# Patient Record
Sex: FEMALE | Race: WHITE | NOT HISPANIC OR LATINO | URBAN - METROPOLITAN AREA
[De-identification: names, ages, dates, MRNs, and addresses within clinical notes are randomized per-mention and may not be internally consistent; named-entity substitution may affect disease eponyms.]

---

## 2020-01-23 ENCOUNTER — OFFICE VISIT (OUTPATIENT)
Dept: INTERNAL MEDICINE | Facility: CLINIC | Age: 25
End: 2020-01-23
Payer: COMMERCIAL

## 2020-01-23 ENCOUNTER — LAB VISIT (OUTPATIENT)
Dept: LAB | Facility: HOSPITAL | Age: 25
End: 2020-01-23
Attending: INTERNAL MEDICINE
Payer: COMMERCIAL

## 2020-01-23 VITALS
RESPIRATION RATE: 16 BRPM | WEIGHT: 204.81 LBS | DIASTOLIC BLOOD PRESSURE: 60 MMHG | SYSTOLIC BLOOD PRESSURE: 114 MMHG | HEIGHT: 67 IN | BODY MASS INDEX: 32.15 KG/M2 | HEART RATE: 82 BPM | TEMPERATURE: 99 F

## 2020-01-23 DIAGNOSIS — F90.9 ATTENTION DEFICIT HYPERACTIVITY DISORDER (ADHD), UNSPECIFIED ADHD TYPE: ICD-10-CM

## 2020-01-23 DIAGNOSIS — Z00.00 ANNUAL PHYSICAL EXAM: ICD-10-CM

## 2020-01-23 DIAGNOSIS — Z98.84 HISTORY OF BARIATRIC SURGERY: ICD-10-CM

## 2020-01-23 DIAGNOSIS — Z00.00 ANNUAL PHYSICAL EXAM: Primary | ICD-10-CM

## 2020-01-23 LAB
25(OH)D3+25(OH)D2 SERPL-MCNC: 40 NG/ML (ref 30–96)
ALBUMIN SERPL BCP-MCNC: 4 G/DL (ref 3.5–5.2)
ALP SERPL-CCNC: 78 U/L (ref 55–135)
ALT SERPL W/O P-5'-P-CCNC: 12 U/L (ref 10–44)
ANION GAP SERPL CALC-SCNC: 9 MMOL/L (ref 8–16)
AST SERPL-CCNC: 16 U/L (ref 10–40)
BASOPHILS # BLD AUTO: 0.03 K/UL (ref 0–0.2)
BASOPHILS NFR BLD: 0.5 % (ref 0–1.9)
BILIRUB SERPL-MCNC: 0.5 MG/DL (ref 0.1–1)
BUN SERPL-MCNC: 10 MG/DL (ref 6–20)
CALCIUM SERPL-MCNC: 9.7 MG/DL (ref 8.7–10.5)
CHLORIDE SERPL-SCNC: 106 MMOL/L (ref 95–110)
CHOLEST SERPL-MCNC: 233 MG/DL (ref 120–199)
CHOLEST/HDLC SERPL: 3.2 {RATIO} (ref 2–5)
CO2 SERPL-SCNC: 28 MMOL/L (ref 23–29)
CREAT SERPL-MCNC: 0.8 MG/DL (ref 0.5–1.4)
DIFFERENTIAL METHOD: ABNORMAL
EOSINOPHIL # BLD AUTO: 0 K/UL (ref 0–0.5)
EOSINOPHIL NFR BLD: 0.7 % (ref 0–8)
ERYTHROCYTE [DISTWIDTH] IN BLOOD BY AUTOMATED COUNT: 12.6 % (ref 11.5–14.5)
EST. GFR  (AFRICAN AMERICAN): >60 ML/MIN/1.73 M^2
EST. GFR  (NON AFRICAN AMERICAN): >60 ML/MIN/1.73 M^2
ESTIMATED AVG GLUCOSE: 100 MG/DL (ref 68–131)
FERRITIN SERPL-MCNC: 59 NG/ML (ref 20–300)
GLUCOSE SERPL-MCNC: 88 MG/DL (ref 70–110)
HBA1C MFR BLD HPLC: 5.1 % (ref 4–5.6)
HCT VFR BLD AUTO: 44.5 % (ref 37–48.5)
HDLC SERPL-MCNC: 72 MG/DL (ref 40–75)
HDLC SERPL: 30.9 % (ref 20–50)
HGB BLD-MCNC: 14.2 G/DL (ref 12–16)
IMM GRANULOCYTES # BLD AUTO: 0.02 K/UL (ref 0–0.04)
IMM GRANULOCYTES NFR BLD AUTO: 0.3 % (ref 0–0.5)
IRON SERPL-MCNC: 118 UG/DL (ref 30–160)
LDLC SERPL CALC-MCNC: 148.8 MG/DL (ref 63–159)
LYMPHOCYTES # BLD AUTO: 2 K/UL (ref 1–4.8)
LYMPHOCYTES NFR BLD: 33.1 % (ref 18–48)
MCH RBC QN AUTO: 30.5 PG (ref 27–31)
MCHC RBC AUTO-ENTMCNC: 31.9 G/DL (ref 32–36)
MCV RBC AUTO: 96 FL (ref 82–98)
MONOCYTES # BLD AUTO: 0.3 K/UL (ref 0.3–1)
MONOCYTES NFR BLD: 5.4 % (ref 4–15)
NEUTROPHILS # BLD AUTO: 3.7 K/UL (ref 1.8–7.7)
NEUTROPHILS NFR BLD: 60 % (ref 38–73)
NONHDLC SERPL-MCNC: 161 MG/DL
NRBC BLD-RTO: 0 /100 WBC
PLATELET # BLD AUTO: 179 K/UL (ref 150–350)
PMV BLD AUTO: 11.8 FL (ref 9.2–12.9)
POTASSIUM SERPL-SCNC: 4.8 MMOL/L (ref 3.5–5.1)
PROT SERPL-MCNC: 7.3 G/DL (ref 6–8.4)
RBC # BLD AUTO: 4.65 M/UL (ref 4–5.4)
SATURATED IRON: 21 % (ref 20–50)
SODIUM SERPL-SCNC: 143 MMOL/L (ref 136–145)
TOTAL IRON BINDING CAPACITY: 559 UG/DL (ref 250–450)
TRANSFERRIN SERPL-MCNC: 378 MG/DL (ref 200–375)
TRIGL SERPL-MCNC: 61 MG/DL (ref 30–150)
TSH SERPL DL<=0.005 MIU/L-ACNC: 1.18 UIU/ML (ref 0.4–4)
WBC # BLD AUTO: 6.08 K/UL (ref 3.9–12.7)

## 2020-01-23 PROCEDURE — 82728 ASSAY OF FERRITIN: CPT

## 2020-01-23 PROCEDURE — 85025 COMPLETE CBC W/AUTO DIFF WBC: CPT

## 2020-01-23 PROCEDURE — 83036 HEMOGLOBIN GLYCOSYLATED A1C: CPT

## 2020-01-23 PROCEDURE — 84443 ASSAY THYROID STIM HORMONE: CPT

## 2020-01-23 PROCEDURE — 99999 PR PBB SHADOW E&M-NEW PATIENT-LVL III: ICD-10-PCS | Mod: PBBFAC,,, | Performed by: INTERNAL MEDICINE

## 2020-01-23 PROCEDURE — 83540 ASSAY OF IRON: CPT

## 2020-01-23 PROCEDURE — 82306 VITAMIN D 25 HYDROXY: CPT

## 2020-01-23 PROCEDURE — 99999 PR PBB SHADOW E&M-NEW PATIENT-LVL III: CPT | Mod: PBBFAC,,, | Performed by: INTERNAL MEDICINE

## 2020-01-23 PROCEDURE — 99385 PREV VISIT NEW AGE 18-39: CPT | Mod: S$GLB,,, | Performed by: INTERNAL MEDICINE

## 2020-01-23 PROCEDURE — 80061 LIPID PANEL: CPT

## 2020-01-23 PROCEDURE — 36415 COLL VENOUS BLD VENIPUNCTURE: CPT | Mod: PO

## 2020-01-23 PROCEDURE — 99385 PR PREVENTIVE VISIT,NEW,18-39: ICD-10-PCS | Mod: S$GLB,,, | Performed by: INTERNAL MEDICINE

## 2020-01-23 PROCEDURE — 80053 COMPREHEN METABOLIC PANEL: CPT

## 2020-01-23 RX ORDER — MULTIVITAMIN
1 TABLET ORAL DAILY
COMMUNITY

## 2020-01-23 RX ORDER — FERROUS SULFATE 324(65)MG
325 TABLET, DELAYED RELEASE (ENTERIC COATED) ORAL DAILY
COMMUNITY

## 2020-01-23 RX ORDER — NORETHINDRONE ACETATE AND ETHINYL ESTRADIOL AND FERROUS FUMARATE 1MG-20(21)
KIT ORAL
COMMUNITY
Start: 2019-10-23

## 2020-01-23 RX ORDER — METHYLPHENIDATE HYDROCHLORIDE 20 MG/1
20 TABLET ORAL 2 TIMES DAILY
COMMUNITY

## 2020-01-23 NOTE — PROGRESS NOTES
Subjective:       Patient ID: Fred Winter is a 24 y.o. female.    Chief Complaint: Annual Exam    HPI    24 y.o. female here for annual exam.     Health Maintenance/ Screening:   Lipid disorders/ASCVD risk: due    DM: A1c due  Cervical Cancer (21-65y):  Has apt scheduled      Vaccines:   Influenza: utd   Tetanus: thinks utd     HPV: unsure, will check her records      Past Medical History:   Diagnosis Date    ADHD     History of Chiari malformation     Iron deficiency     PCOS (polycystic ovarian syndrome)      Past Surgical History:   Procedure Laterality Date    ROBOT-ASSISTED LAPAROSCOPIC SLEEVE GASTRECTOMY      SINUS SURGERY      TONSILLECTOMY       Family History   Problem Relation Age of Onset    Multiple sclerosis Mother     Chiari malformation Mother     No Known Problems Father     Chiari malformation Sister     Diabetes Maternal Grandmother     Thyroid disease Maternal Grandmother     Hypertension Maternal Grandmother     Heart disease Maternal Grandmother     Hypertension Maternal Grandfather     Heart disease Maternal Grandfather      Social History     Socioeconomic History    Marital status: Single     Spouse name: Not on file    Number of children: Not on file    Years of education: Not on file    Highest education level: Not on file   Occupational History    Not on file   Social Needs    Financial resource strain: Not on file    Food insecurity:     Worry: Not on file     Inability: Not on file    Transportation needs:     Medical: Not on file     Non-medical: Not on file   Tobacco Use    Smoking status: Never Smoker    Smokeless tobacco: Never Used   Substance and Sexual Activity    Alcohol use: Not on file    Drug use: Not on file    Sexual activity: Not on file   Lifestyle    Physical activity:     Days per week: Not on file     Minutes per session: Not on file    Stress: Not on file   Relationships    Social connections:     Talks on phone: Not on file     Gets  "together: Not on file     Attends Muslim service: Not on file     Active member of club or organization: Not on file     Attends meetings of clubs or organizations: Not on file     Relationship status: Not on file   Other Topics Concern    Not on file   Social History Narrative    Not on file     Review of patient's allergies indicates:   Allergen Reactions    Latex, natural rubber        Current Outpatient Medications:     BIOTIN ORAL, Take by mouth., Disp: , Rfl:     cyanocobalamin/cobamamide (B12 SL), Place under the tongue., Disp: , Rfl:     ferrous sulfate 324 mg (65 mg iron) TbEC, Take 325 mg by mouth once daily., Disp: , Rfl:     JUNEL FE 1/20, 28, 1 mg-20 mcg (21)/75 mg (7) per tablet, TK 1 T PO QD, Disp: , Rfl:     methylphenidate HCl (RITALIN) 20 MG tablet, Take 20 mg by mouth 2 (two) times daily., Disp: , Rfl:     multivitamin (ONE DAILY MULTIVITAMIN) per tablet, Take 1 tablet by mouth once daily., Disp: , Rfl:       Review of Systems   Constitutional: Negative for diaphoresis and fever.   HENT: Negative for dental problem and trouble swallowing.    Eyes: Negative for discharge and redness.   Respiratory: Negative for cough and shortness of breath.    Cardiovascular: Negative for chest pain and leg swelling.   Gastrointestinal: Negative for abdominal pain, blood in stool, constipation and diarrhea.   Genitourinary: Negative for difficulty urinating, dysuria and frequency.   Musculoskeletal: Negative for gait problem and joint swelling.   Skin: Negative for pallor, rash and wound.   Neurological: Negative for weakness and numbness.       Objective:        Vitals:    01/23/20 1133   BP: 114/60   Pulse: 82   Resp: 16   Temp: 98.7 °F (37.1 °C)   TempSrc: Oral   Weight: 92.9 kg (204 lb 12.9 oz)   Height: 5' 7" (1.702 m)       Body mass index is 32.08 kg/m².    Physical Exam   Constitutional: She is oriented to person, place, and time. She appears well-developed. No distress.   HENT:   Head: " Normocephalic and atraumatic.   Right Ear: Tympanic membrane normal.   Left Ear: Tympanic membrane normal.   Nose: Nose normal.   Mouth/Throat: Oropharynx is clear and moist.   Eyes: Conjunctivae and EOM are normal.   Neck: Normal range of motion. Neck supple. No tracheal deviation present. No thyromegaly present.   Cardiovascular: Normal rate, regular rhythm, normal heart sounds and intact distal pulses.   Pulmonary/Chest: Effort normal and breath sounds normal.   Abdominal: Soft. Bowel sounds are normal. She exhibits no distension. There is no tenderness.   Musculoskeletal: Normal range of motion. She exhibits no edema.   Lymphadenopathy:     She has no cervical adenopathy.   Neurological: She is alert and oriented to person, place, and time. No sensory deficit.   Skin: Skin is warm and dry. She is not diaphoretic. No cyanosis. Nails show no clubbing.   Psychiatric: She has a normal mood and affect. Her behavior is normal. Judgment normal.       Assessment:     1. Annual physical exam    2. History of bariatric surgery    3. Attention deficit hyperactivity disorder (ADHD), unspecified ADHD type           Plan:         1. Annual physical exam  - flu shot utd  - tetanus precautions discussed  - she will notify clinic if she wants prescription for HPV vaccines  - gyn exam scheduled  - CBC auto differential; Future  - Comprehensive metabolic panel; Future  - Hemoglobin A1c; Future  - Lipid panel; Future  - TSH; Future  - Iron and TIBC; Future  - Ferritin; Future  - Vitamin D; Future    2. History of bariatric surgery  - Comprehensive metabolic panel; Future  - Iron and TIBC; Future  - Ferritin; Future  - Vitamin D; Future    3. Attention deficit hyperactivity disorder (ADHD), unspecified ADHD type  - she is weaning off of medications currently, but will bring testing records and notify me if she would like to continue medications.            Patient note was created using [employer] Dict[employer].  Any errors in syntax or  even information may not have been identified and edited on initial review prior to signing this note.

## 2020-01-24 ENCOUNTER — TELEPHONE (OUTPATIENT)
Dept: INTERNAL MEDICINE | Facility: CLINIC | Age: 25
End: 2020-01-24

## 2020-01-24 NOTE — TELEPHONE ENCOUNTER
----- Message from Devi Vaughan MD sent at 1/24/2020  7:10 AM CST -----  Results released to patient portal.

## 2020-03-02 ENCOUNTER — OFFICE VISIT (OUTPATIENT)
Dept: INTERNAL MEDICINE | Facility: CLINIC | Age: 25
End: 2020-03-02
Payer: COMMERCIAL

## 2020-03-02 VITALS
HEART RATE: 80 BPM | HEIGHT: 67 IN | WEIGHT: 205.5 LBS | TEMPERATURE: 99 F | BODY MASS INDEX: 32.25 KG/M2 | SYSTOLIC BLOOD PRESSURE: 102 MMHG | RESPIRATION RATE: 18 BRPM | DIASTOLIC BLOOD PRESSURE: 68 MMHG

## 2020-03-02 DIAGNOSIS — J06.9 URTI (ACUTE UPPER RESPIRATORY INFECTION): Primary | ICD-10-CM

## 2020-03-02 DIAGNOSIS — J40 BRONCHITIS: ICD-10-CM

## 2020-03-02 DIAGNOSIS — R05.9 COUGH: ICD-10-CM

## 2020-03-02 PROCEDURE — 99214 OFFICE O/P EST MOD 30 MIN: CPT | Mod: 25,S$GLB,, | Performed by: NURSE PRACTITIONER

## 2020-03-02 PROCEDURE — 99999 PR PBB SHADOW E&M-EST. PATIENT-LVL III: CPT | Mod: PBBFAC,,, | Performed by: NURSE PRACTITIONER

## 2020-03-02 PROCEDURE — 96372 PR INJECTION,THERAP/PROPH/DIAG2ST, IM OR SUBCUT: ICD-10-PCS | Mod: S$GLB,,, | Performed by: NURSE PRACTITIONER

## 2020-03-02 PROCEDURE — 99214 PR OFFICE/OUTPT VISIT, EST, LEVL IV, 30-39 MIN: ICD-10-PCS | Mod: 25,S$GLB,, | Performed by: NURSE PRACTITIONER

## 2020-03-02 PROCEDURE — 96372 THER/PROPH/DIAG INJ SC/IM: CPT | Mod: S$GLB,,, | Performed by: NURSE PRACTITIONER

## 2020-03-02 PROCEDURE — 99999 PR PBB SHADOW E&M-EST. PATIENT-LVL III: ICD-10-PCS | Mod: PBBFAC,,, | Performed by: NURSE PRACTITIONER

## 2020-03-02 RX ORDER — AMOXICILLIN AND CLAVULANATE POTASSIUM 875; 125 MG/1; MG/1
1 TABLET, FILM COATED ORAL EVERY 12 HOURS
Qty: 14 TABLET | Refills: 0 | Status: SHIPPED | OUTPATIENT
Start: 2020-03-02 | End: 2020-03-09

## 2020-03-02 RX ORDER — NORETHINDRONE ACETATE AND ETHINYL ESTRADIOL 1.5-30(21)
1 KIT ORAL DAILY
COMMUNITY

## 2020-03-02 RX ORDER — BENZONATATE 200 MG/1
200 CAPSULE ORAL 3 TIMES DAILY PRN
Qty: 30 CAPSULE | Refills: 0 | Status: SHIPPED | OUTPATIENT
Start: 2020-03-02 | End: 2020-03-12

## 2020-03-02 RX ORDER — TRIAMCINOLONE ACETONIDE 40 MG/ML
40 INJECTION, SUSPENSION INTRA-ARTICULAR; INTRAMUSCULAR ONCE
Status: COMPLETED | OUTPATIENT
Start: 2020-03-02 | End: 2020-03-02

## 2020-03-02 RX ADMIN — TRIAMCINOLONE ACETONIDE 40 MG: 40 INJECTION, SUSPENSION INTRA-ARTICULAR; INTRAMUSCULAR at 11:03

## 2020-03-02 NOTE — PROGRESS NOTES
[facility] Note    Chief Complaint      Chief Complaint   Patient presents with    Cough     dry    Chest Congestion    Generalized Body Aches     History of Present Illness      Fred Winter is a 24 y.o. female patient of Dr. Stephens who is new to me presents today for complaints coarse voice intermittent ear fullness sore throat dry cough chest congestion with some intermittent body aches over the last 2 weeks.  Has been taken xyzol without relief.    Problem List Items Addressed This Visit     None      Visit Diagnoses     URTI (acute upper respiratory infection)    -  Primary    Relevant Medications    triamcinolone acetonide injection 40 mg (Completed)    amoxicillin-clavulanate 875-125mg (AUGMENTIN) 875-125 mg per tablet    benzonatate (TESSALON) 200 MG capsule    Bronchitis        Relevant Medications    triamcinolone acetonide injection 40 mg (Completed)    amoxicillin-clavulanate 875-125mg (AUGMENTIN) 875-125 mg per tablet    benzonatate (TESSALON) 200 MG capsule    Cough        Relevant Medications    triamcinolone acetonide injection 40 mg (Completed)    amoxicillin-clavulanate 875-125mg (AUGMENTIN) 875-125 mg per tablet    benzonatate (TESSALON) 200 MG capsule          Health Maintenance   Topic Date Due    HPV Vaccines (1 - Female 3-dose series) 03/03/2008    TETANUS VACCINE  03/03/2011    Pap Smear  03/03/2014    Lipid Panel  Completed       Past Medical History:   Diagnosis Date    ADHD     History of Chiari malformation     Iron deficiency     PCOS (polycystic ovarian syndrome)        Past Surgical History:   Procedure Laterality Date    ROBOT-ASSISTED LAPAROSCOPIC SLEEVE GASTRECTOMY      SINUS SURGERY      TONSILLECTOMY         family history includes Chiari malformation in her mother and sister; Diabetes in her maternal grandmother; Heart disease in her maternal grandfather and maternal grandmother; Hypertension in her maternal grandfather and maternal grandmother;  Multiple sclerosis in her mother; No Known Problems in her father; Thyroid disease in her maternal grandmother.    Social History     Tobacco Use    Smoking status: Never Smoker    Smokeless tobacco: Never Used   Substance Use Topics    Alcohol use: Not on file    Drug use: Not on file       Review of Systems   Constitutional: Negative for chills and fever.   HENT: Positive for congestion, sinus pain and sore throat. Negative for ear pain.    Respiratory: Positive for cough. Negative for sputum production and shortness of breath.    Cardiovascular: Negative for chest pain.   Musculoskeletal: Positive for myalgias.   Neurological: Positive for weakness. Negative for dizziness and headaches.        Outpatient Encounter Medications as of 3/2/2020   Medication Sig Dispense Refill    BIOTIN ORAL Take by mouth.      cyanocobalamin/cobamamide (B12 SL) Place under the tongue.      ferrous sulfate 324 mg (65 mg iron) TbEC Take 325 mg by mouth once daily.      methylphenidate HCl (RITALIN) 20 MG tablet Take 20 mg by mouth 2 (two) times daily.      multivitamin (ONE DAILY MULTIVITAMIN) per tablet Take 1 tablet by mouth once daily.      norethindrone-ethinyl estradiol-iron (BLISOVI FE 1.5/30, 28,) 1.5 mg-30 mcg (21)/75 mg (7) tablet Take 1 tablet by mouth once daily.      amoxicillin-clavulanate 875-125mg (AUGMENTIN) 875-125 mg per tablet Take 1 tablet by mouth every 12 (twelve) hours. for 7 days 14 tablet 0    benzonatate (TESSALON) 200 MG capsule Take 1 capsule (200 mg total) by mouth 3 (three) times daily as needed for Cough. 30 capsule 0    JUNEL FE 1/20, 28, 1 mg-20 mcg (21)/75 mg (7) per tablet TK 1 T PO QD       Facility-Administered Encounter Medications as of 3/2/2020   Medication Dose Route Frequency Provider Last Rate Last Dose    [COMPLETED] triamcinolone acetonide injection 40 mg  40 mg Intramuscular Once Fiordaliza Salmeron NP   40 mg at 03/02/20 4658        Review of patient's allergies indicates:  "  Allergen Reactions    Latex, natural rubber        Physical Exam      Vital Signs  Temp: 99 °F (37.2 °C)  Temp src: Oral  Pulse: 80  Resp: 18  BP: 102/68  BP Location: Right arm  Patient Position: Sitting  Pain Score:   4  Pain Loc: Generalized  Height and Weight  Height: 5' 7" (170.2 cm)  Weight: 93.2 kg (205 lb 7.5 oz)  BSA (Calculated - sq m): 2.1 sq meters  BMI (Calculated): 32.2  Weight in (lb) to have BMI = 25: 159.3]    Physical Exam   Constitutional: She is oriented to person, place, and time. She appears well-developed and well-nourished.   HENT:   Head: Normocephalic and atraumatic.   Right Ear: External ear normal.   Left Ear: External ear normal.   Redness of bilateral ear canals, erythema noted to right TM, erythema to oropharynx noted   Eyes: Pupils are equal, round, and reactive to light. Conjunctivae and EOM are normal.   Neck: Normal range of motion. Neck supple.   Cardiovascular: Normal rate, regular rhythm and normal heart sounds.   No murmur heard.  Pulmonary/Chest: Effort normal and breath sounds normal. She exhibits no tenderness.   Abdominal: Soft.   Musculoskeletal: Normal range of motion.   Lymphadenopathy:     She has no cervical adenopathy.   Neurological: She is alert and oriented to person, place, and time.   Skin: Skin is warm and dry.   Psychiatric: She has a normal mood and affect. Her behavior is normal. Judgment and thought content normal.   Nursing note and vitals reviewed.       Laboratory:  CBC:  Recent Labs   Lab Result Units 01/23/20  1225   WBC K/uL 6.08   RBC M/uL 4.65   Hemoglobin g/dL 14.2   Hematocrit % 44.5   Platelets K/uL 179   Mean Corpuscular Volume fL 96   Mean Corpuscular Hemoglobin pg 30.5   Mean Corpuscular Hemoglobin Conc g/dL 31.9*     CMP:  Recent Labs   Lab Result Units 01/23/20  1225   Glucose mg/dL 88   Calcium mg/dL 9.7   Albumin g/dL 4.0   Total Protein g/dL 7.3   Sodium mmol/L 143   Potassium mmol/L 4.8   CO2 mmol/L 28   Chloride mmol/L 106   BUN, Bld " mg/dL 10   Alkaline Phosphatase U/L 78   ALT U/L 12   AST U/L 16   Total Bilirubin mg/dL 0.5     URINALYSIS:  No results for input(s): COLORU, CLARITYU, SPECGRAV, PHUR, PROTEINUA, GLUCOSEU, BILIRUBINCON, BLOODU, WBCU, RBCU, BACTERIA, MUCUS, NITRITE, LEUKOCYTESUR, UROBILINOGEN, HYALINECASTS in the last 2160 hours.   LIPIDS:  Recent Labs   Lab Result Units 01/23/20  1225   TSH uIU/mL 1.182   HDL mg/dL 72   Cholesterol mg/dL 233*   Triglycerides mg/dL 61   LDL Cholesterol mg/dL 148.8   Hdl/Cholesterol Ratio % 30.9   Non-HDL Cholesterol mg/dL 161   Total Cholesterol/HDL Ratio  3.2     TSH:  Recent Labs   Lab Result Units 01/23/20  1225   TSH uIU/mL 1.182     A1C:  Recent Labs   Lab Result Units 01/23/20  1225   Hemoglobin A1C % 5.1         Assessment/Plan     Fred Winter is a 24 y.o.female with:    1. URTI (acute upper respiratory infection)  - triamcinolone acetonide injection 40 mg  - amoxicillin-clavulanate 875-125mg (AUGMENTIN) 875-125 mg per tablet; Take 1 tablet by mouth every 12 (twelve) hours. for 7 days  Dispense: 14 tablet; Refill: 0  - benzonatate (TESSALON) 200 MG capsule; Take 1 capsule (200 mg total) by mouth 3 (three) times daily as needed for Cough.  Dispense: 30 capsule; Refill: 0    2. Bronchitis  - triamcinolone acetonide injection 40 mg  - amoxicillin-clavulanate 875-125mg (AUGMENTIN) 875-125 mg per tablet; Take 1 tablet by mouth every 12 (twelve) hours. for 7 days  Dispense: 14 tablet; Refill: 0  - benzonatate (TESSALON) 200 MG capsule; Take 1 capsule (200 mg total) by mouth 3 (three) times daily as needed for Cough.  Dispense: 30 capsule; Refill: 0    3. Cough  - triamcinolone acetonide injection 40 mg  - amoxicillin-clavulanate 875-125mg (AUGMENTIN) 875-125 mg per tablet; Take 1 tablet by mouth every 12 (twelve) hours. for 7 days  Dispense: 14 tablet; Refill: 0  - benzonatate (TESSALON) 200 MG capsule; Take 1 capsule (200 mg total) by mouth 3 (three) times daily as needed for Cough.  Dispense: 30  capsule; Refill: 0      -Continue current medications and maintain follow up with specialists.  Return to clinic as needed for any concerns       Erin Jiminez, NP-C [facility]

## 2020-03-02 NOTE — LETTER
March 2, 2020      [place] - [facility]                                                                                                                                                       [address].  [place] [address]  Phone: [phone]  Fax: [phone]       Patient: Fred Winter   YOB: 1995  Date of Visit: 03/02/2020    To Whom It May Concern:    Tamiko Winter  was at [facility] on 03/02/2020. She may return to work/school on 3/4/2020 with no restrictions. If you have any questions or concerns, or if I can be of further assistance, please do not hesitate to contact me.    Sincerely,        Fiordaliza Salmeron NP

## 2020-03-04 ENCOUNTER — PATIENT MESSAGE (OUTPATIENT)
Dept: INTERNAL MEDICINE | Facility: CLINIC | Age: 25
End: 2020-03-04

## 2020-03-04 ENCOUNTER — TELEPHONE (OUTPATIENT)
Dept: INTERNAL MEDICINE | Facility: CLINIC | Age: 25
End: 2020-03-04

## 2020-03-04 NOTE — TELEPHONE ENCOUNTER
----- Message from Chayito Espinoza sent at 3/4/2020 10:12 AM CST -----  Contact: pt  Pt called and said she saw Dr. Salmeron on Monday     Pt is still not feeling well and the fever is not going away and have new symptoms     Pt really wants someone to call her back as soon as possible     Pt can be reached at [phone]

## 2020-03-04 NOTE — TELEPHONE ENCOUNTER
Don't really see any swelling except maybe the top left lip area. If she thinks she is having some type of reaction, worsening swelling, trouble swallowing or breathing,  she will need to go to the ER for immediate attention.

## 2020-03-04 NOTE — TELEPHONE ENCOUNTER
Pt declines any swallowing, breathing troubles. Instructed if the fever is not bothering her to let it runs its coarse. PT has medrol dose pk at home that she can complete also instructed to take zyrtec

## 2020-03-04 NOTE — TELEPHONE ENCOUNTER
Pt states now she's now developed a cough, is taking tylenol around the clock every 4-6 hours. Left side facial swelling, chest hurting from cough. Asked pt to send a picture on SABINE of facial swelling. Also, has diarrhea

## 2020-03-11 ENCOUNTER — TELEPHONE (OUTPATIENT)
Dept: INTERNAL MEDICINE | Facility: CLINIC | Age: 25
End: 2020-03-11

## 2020-03-11 NOTE — LETTER
March 11, 2020      [place] - [facility]                                                                                                                                                       [address].  [place] [address]  Phone: [phone]  Fax: [phone]       Patient: Fred Winter   YOB: 1995  Date of Visit: 03/11/2020    To Whom It May Concern:    Tamiko Winter  was at [facility] on 03/02/2020. Per CDC department guidelines, Patient does not currently fit the criteria for testing of the COVID-19 virus.  We will let you know if any further testing becomes available.  If you have any questions or concerns, or if I can be of further assistance, please do not hesitate to contact me.    Sincerely,    Fiordaliza Salmeron NP

## 2020-03-11 NOTE — TELEPHONE ENCOUNTER
pts job is requesting she be tested for covid-19. Pt had a URTI last week that has cleared but pt still has a cough. Pt doesn't currently have any covid symptoms, so [place] health dept may not test her.    Please advise

## 2020-03-11 NOTE — LETTER
March 11, 2020      [place] - [facility] Medicine                                                                                                                                                       [address].  [place] [address]  Phone: [phone]  Fax: [phone]       Patient: Fred Winter   YOB: 1995  Date of Visit: 03/11/2020    To Whom It May Concern:    Tamiko Winter  was at [facility] on 03/02/2020. Per CDC department guidelines, Patient does not currently fit the criteria for testing of the COVID-19 virus.  We will let you know if any further testing becomes available.  If you have any questions or concerns, or if I can be of further assistance, please do not hesitate to contact me.    Sincerely,    Fiordaliza Salmeron NP

## 2020-03-11 NOTE — TELEPHONE ENCOUNTER
----- Message from Chetan Abreu sent at 3/11/2020  9:45 AM CDT -----  Contact: self  Pt is requesting a call back at [phone] concerning last office visit to be cleared for employer due to symptoms and the Covid -19 precaution

## 2020-04-02 ENCOUNTER — TELEPHONE (OUTPATIENT)
Dept: INTERNAL MEDICINE | Facility: CLINIC | Age: 25
End: 2020-04-02

## 2020-04-02 ENCOUNTER — HOSPITAL ENCOUNTER (EMERGENCY)
Facility: OTHER | Age: 25
Discharge: HOME | End: 2020-04-02
Attending: EMERGENCY MEDICINE
Payer: COMMERCIAL

## 2020-04-02 VITALS
HEIGHT: 67 IN | OXYGEN SATURATION: 96 % | RESPIRATION RATE: 18 BRPM | WEIGHT: 200 LBS | DIASTOLIC BLOOD PRESSURE: 76 MMHG | TEMPERATURE: 98 F | HEART RATE: 98 BPM | BODY MASS INDEX: 31.39 KG/M2 | SYSTOLIC BLOOD PRESSURE: 132 MMHG

## 2020-04-02 DIAGNOSIS — R05.9 COUGH: Primary | ICD-10-CM

## 2020-04-02 DIAGNOSIS — J18.9 PNEUMONIA OF RIGHT MIDDLE LOBE DUE TO INFECTIOUS ORGANISM: ICD-10-CM

## 2020-04-02 LAB
B-HCG UR QL: NEGATIVE
CTP QC/QA: YES

## 2020-04-02 PROCEDURE — 99284 EMERGENCY DEPT VISIT MOD MDM: CPT | Mod: 25

## 2020-04-02 PROCEDURE — 81025 URINE PREGNANCY TEST: CPT | Performed by: PHYSICIAN ASSISTANT

## 2020-04-02 RX ORDER — BENZONATATE 100 MG/1
100 CAPSULE ORAL 3 TIMES DAILY PRN
Qty: 20 CAPSULE | Refills: 0 | Status: SHIPPED | OUTPATIENT
Start: 2020-04-02 | End: 2020-04-12

## 2020-04-02 RX ORDER — FLUCONAZOLE 200 MG/1
200 TABLET ORAL DAILY
Qty: 1 TABLET | Refills: 0 | Status: SHIPPED | OUTPATIENT
Start: 2020-04-02 | End: 2020-04-03

## 2020-04-02 RX ORDER — PROMETHAZINE HYDROCHLORIDE 25 MG/1
25 TABLET ORAL NIGHTLY PRN
Qty: 15 TABLET | Refills: 0 | Status: SHIPPED | OUTPATIENT
Start: 2020-04-02

## 2020-04-02 RX ORDER — ALBUTEROL SULFATE 90 UG/1
1-2 AEROSOL, METERED RESPIRATORY (INHALATION) EVERY 6 HOURS PRN
Qty: 6.7 G | Refills: 0 | Status: SHIPPED | OUTPATIENT
Start: 2020-04-02 | End: 2021-04-02

## 2020-04-02 RX ORDER — AZITHROMYCIN 250 MG/1
250 TABLET, FILM COATED ORAL DAILY
Qty: 6 TABLET | Refills: 0 | Status: SHIPPED | OUTPATIENT
Start: 2020-04-02 | End: 2020-04-12

## 2020-04-02 NOTE — TELEPHONE ENCOUNTER
----- Message from Iainjoycelyn Cyril sent at 4/2/2020 10:24 AM CDT -----  Contact: Self   Patient state she was tested for COVID-19 but have not received her results yet. Patient is calling to see if she can get a Rx for the cough and get recommendation on what she should do. Patient state she have to sleep up right because of the shortness of breath. Please call and advise.

## 2020-04-02 NOTE — TELEPHONE ENCOUNTER
Spoke to pt and told her Dr Vaughan want's her to be evaluated in the ER for SOB and abnormal lung sounds. She asked which hospital she should go to I gave her the 3 closest locations to her and she is going to to

## 2020-04-02 NOTE — TELEPHONE ENCOUNTER
Pt was tested for Covid on 3/31/20. She doesn't have results yet. She is requesting a cough medicine. She said she is using mucinex and tylenol for fever but she is having trouble with SOB and coughing more at night. She hears crackling in her chest    Please advise

## 2020-04-02 NOTE — ED PROVIDER NOTES
CHIEF COMPLAINT:   Chief Complaint   Patient presents with    Cough     reports fever and SOB       HISTORY OF PRESENT ILLNESS: Fred Winter who is a 25 y.o. presents to the emergency department today with complaint of continued flu-like symptoms for the past 1 week.  She states that she has fever, chills, body aches, cough and shortness of breath.  She does report that symptoms have gradually worsened since onset.  She states that she has some right-sided chest pain with deep inspiration and coughing.  She reports cough is worse at night and is keeping her up during the night.  She states her last Tylenol was earlier this morning with a T-max of 102° and that fever does improve with antipyretics.  She states that she is not seeing any relief and cough with over-the-counter agents.  She did have COVID screening earlier this week however is awaiting results.  She denies any productive cough, wheezing, nausea, vomiting, diarrhea or rash.    REVIEW OF SYSTEMS:  Constitutional: +fever, +chills.  Eyes: No discharge. No pain.  HENT: +nasal congestion; No sore throat.   Cardiovascular: + right-sided chest pain with coughing and deep inspiration no palpitations.  Respiratory: +cough, +shortness of breath.  Gastrointestinal:  No nausea, no diarrhea; No abdominal pain, no vomiting.   Genitourinary: No hematuria, dysuria, urgency.  Musculoskeletal: + Generalized body aches  Skin: No rashes, no lesions.  Neurological: No headache, no focal weakness.    Otherwise remaining ROS negative     ALLERGIES REVIEWED  MEDICATIONS REVIEWED  PMH/PSH/SOC/FH REVIEWED     The history is provided by the patient.    Nursing/Ancillary staff note reviewed.        PHYSICAL EXAM:  VS reviewed  Vitals:    04/02/20 1305   BP:    Pulse: 98   Resp:    Temp:        General Appearance: The patient is alert, has no immediate or signs of toxicity. No acute distress.    HEENT: Eyes: Pupils equal; Extra ocular movements intact. No drainage.   Neck:Neck is  supple non-tender. No lymphadenopathy. No stridor.   Respiratory:  Course breath sounds bilaterally  Cardiovascular: Regular rate and rhythm. No murmurs, rubs or gallops.  Gastrointestinal:  Abdomen with no distention  Neurological: Alert and oriented x 4. No focal weakness. Strength intact 5/5 bilaterally in upper and lower extremities.   Skin: Warm and dry, no rashes.  Musculoskeletal: Extremities are non-tender, non-swollen and have full range of motion.      Past Medical History:   Diagnosis Date    ADHD     History of Chiari malformation     Iron deficiency     PCOS (polycystic ovarian syndrome)          Past Surgical History:   Procedure Laterality Date    ROBOT-ASSISTED LAPAROSCOPIC SLEEVE GASTRECTOMY      SINUS SURGERY      TONSILLECTOMY                  ED COURSE:     Patient presenting with general illness symptoms; appears well and nontoxic.  Of note she does have coarse breath sounds bilaterally; no wheezes; no tachycardia    DIFFERENTIAL DIAGNOSIS: After history and physical exam a differential diagnosis was considered, but was not limited to,   Sepsis, meningitis, otitis media/external, nasal polyp, bacterial sinusitis, allergic rhinitis, influenza, COVID19, bacterial/viral pharyngitis, bacterial/viral pneumonia.    ED management: Patient seen for a viral-like illness, given her worsening symptoms chest x-ray was obtained.  Chest x-ray does reveal right middle lung lobe pneumonia.  Did discuss with patient that this is not a typical presentation with COVID however could be related to it.  As this is not a typical pneumonia we will start on azithromycin to cover for any post viral or bacterial etiology.  Patient did remain well-appearing with stable vitals and no hypoxia including on ambulatory trial.      IMPRESSION  The primary encounter diagnosis was Cough. A diagnosis of Pneumonia of right middle lobe due to infectious organism was also pertinent to this visit.  She will be sent home with  inhaler, azithromycin, Tessalon and Phenergan to help with cough during nighttime however was cautioned on judicious use.  Patient will be enrolled and COVID monitoring system. Strict instructions to follow up with primary care physician or reference provided for further assessment and evaluation. Given instructions to return for any acute symptoms and verbalized understanding of this medical plan.                                   YUE Mathew  04/02/20 1500

## 2021-04-05 ENCOUNTER — PATIENT MESSAGE (OUTPATIENT)
Dept: ADMINISTRATIVE | Facility: HOSPITAL | Age: 26
End: 2021-04-05

## 2021-04-06 ENCOUNTER — PATIENT OUTREACH (OUTPATIENT)
Dept: ADMINISTRATIVE | Facility: HOSPITAL | Age: 26
End: 2021-04-06